# Patient Record
Sex: MALE | Race: OTHER | NOT HISPANIC OR LATINO | URBAN - NONMETROPOLITAN AREA
[De-identification: names, ages, dates, MRNs, and addresses within clinical notes are randomized per-mention and may not be internally consistent; named-entity substitution may affect disease eponyms.]

---

## 2018-06-12 ENCOUNTER — APPOINTMENT (RX ONLY)
Dept: URBAN - NONMETROPOLITAN AREA CLINIC 3 | Facility: CLINIC | Age: 68
Setting detail: DERMATOLOGY
End: 2018-06-12

## 2018-06-12 DIAGNOSIS — L81.0 POSTINFLAMMATORY HYPERPIGMENTATION: ICD-10-CM

## 2018-06-12 DIAGNOSIS — L57.8 OTHER SKIN CHANGES DUE TO CHRONIC EXPOSURE TO NONIONIZING RADIATION: ICD-10-CM

## 2018-06-12 PROBLEM — L85.3 XEROSIS CUTIS: Status: ACTIVE | Noted: 2018-06-12

## 2018-06-12 PROBLEM — M12.9 ARTHROPATHY, UNSPECIFIED: Status: ACTIVE | Noted: 2018-06-12

## 2018-06-12 PROBLEM — I48.91 UNSPECIFIED ATRIAL FIBRILLATION: Status: ACTIVE | Noted: 2018-06-12

## 2018-06-12 PROBLEM — I25.10 ATHEROSCLEROTIC HEART DISEASE OF NATIVE CORONARY ARTERY WITHOUT ANGINA PECTORIS: Status: ACTIVE | Noted: 2018-06-12

## 2018-06-12 PROBLEM — J30.1 ALLERGIC RHINITIS DUE TO POLLEN: Status: ACTIVE | Noted: 2018-06-12

## 2018-06-12 PROBLEM — E13.9 OTHER SPECIFIED DIABETES MELLITUS WITHOUT COMPLICATIONS: Status: ACTIVE | Noted: 2018-06-12

## 2018-06-12 PROBLEM — I10 ESSENTIAL (PRIMARY) HYPERTENSION: Status: ACTIVE | Noted: 2018-06-12

## 2018-06-12 PROCEDURE — ? COUNSELING

## 2018-06-12 PROCEDURE — ? SUNSCREEN RECOMMENDATIONS

## 2018-06-12 PROCEDURE — 99202 OFFICE O/P NEW SF 15 MIN: CPT

## 2018-06-12 ASSESSMENT — LOCATION DETAILED DESCRIPTION DERM
LOCATION DETAILED: LEFT MEDIAL FOREHEAD
LOCATION DETAILED: LEFT INFERIOR CENTRAL MALAR CHEEK
LOCATION DETAILED: RIGHT CENTRAL MALAR CHEEK
LOCATION DETAILED: RIGHT INFERIOR CENTRAL MALAR CHEEK

## 2018-06-12 ASSESSMENT — LOCATION SIMPLE DESCRIPTION DERM
LOCATION SIMPLE: LEFT CHEEK
LOCATION SIMPLE: RIGHT CHEEK
LOCATION SIMPLE: LEFT FOREHEAD

## 2018-06-12 ASSESSMENT — LOCATION ZONE DERM: LOCATION ZONE: FACE

## 2018-06-12 NOTE — HPI: SKIN LESIONS
Is This A New Presentation, Or A Follow-Up?: Skin Lesion
How Severe Is Your Skin Lesion?: mild
Have Your Skin Lesions Been Treated?: not been treated
Additional History: Patient had back surgery in February where he was wearing an oxygen mask with tape. 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 He is wanting to know if he can do anything to decrease the pigment of the rash.

## 2018-06-12 NOTE — PROCEDURE: COUNSELING
Patient Specific Counseling (Will Not Stick From Patient To Patient): Patient will wait until December and see how it fades. If not to his liking he will be prescribed Triluma.
Detail Level: Zone